# Patient Record
Sex: MALE | Race: WHITE | Employment: OTHER | URBAN - METROPOLITAN AREA
[De-identification: names, ages, dates, MRNs, and addresses within clinical notes are randomized per-mention and may not be internally consistent; named-entity substitution may affect disease eponyms.]

---

## 2017-05-15 ENCOUNTER — ANESTHESIA EVENT (OUTPATIENT)
Dept: SURGERY | Age: 77
End: 2017-05-15
Payer: MEDICARE

## 2017-05-15 ENCOUNTER — HOSPITAL ENCOUNTER (OUTPATIENT)
Age: 77
Setting detail: OBSERVATION
Discharge: HOME | End: 2017-05-16
Attending: UROLOGY | Admitting: UROLOGY
Payer: MEDICARE

## 2017-05-15 ENCOUNTER — ANESTHESIA (OUTPATIENT)
Dept: SURGERY | Age: 77
End: 2017-05-15
Payer: MEDICARE

## 2017-05-15 PROBLEM — N32.0 BLADDER NECK CONTRACTURE: Status: ACTIVE | Noted: 2017-05-15

## 2017-05-15 PROCEDURE — 77030018846 HC SOL IRR STRL H20 ICUM -A: Performed by: UROLOGY

## 2017-05-15 PROCEDURE — 74011000258 HC RX REV CODE- 258: Performed by: UROLOGY

## 2017-05-15 PROCEDURE — 77030032490 HC SLV COMPR SCD KNE COVD -B: Performed by: UROLOGY

## 2017-05-15 PROCEDURE — 77030019927 HC TBNG IRR CYSTO BAXT -A: Performed by: UROLOGY

## 2017-05-15 PROCEDURE — 74011250637 HC RX REV CODE- 250/637: Performed by: UROLOGY

## 2017-05-15 PROCEDURE — 77030013614: Performed by: UROLOGY

## 2017-05-15 PROCEDURE — 77030021163 HC TUBE CYSTO IRR ICUM -A: Performed by: UROLOGY

## 2017-05-15 PROCEDURE — 77030006566 HC BG URIN -A: Performed by: UROLOGY

## 2017-05-15 PROCEDURE — 77030005546 HC CATH URETH FOL 3W BARD -A: Performed by: UROLOGY

## 2017-05-15 PROCEDURE — 77030010545: Performed by: UROLOGY

## 2017-05-15 PROCEDURE — 77030012884 HC SLV COMPR SCD MTEK -B: Performed by: UROLOGY

## 2017-05-15 PROCEDURE — 77030013079 HC BLNKT BAIR HGGR 3M -A: Performed by: ANESTHESIOLOGY

## 2017-05-15 PROCEDURE — 74011250637 HC RX REV CODE- 250/637: Performed by: ANESTHESIOLOGY

## 2017-05-15 PROCEDURE — 99218 HC RM OBSERVATION: CPT

## 2017-05-15 PROCEDURE — 77030012510 HC MSK AIRWY LMA TELE -B: Performed by: ANESTHESIOLOGY

## 2017-05-15 PROCEDURE — 77030005520 HC CATH URETH FOL38 BARD -A: Performed by: UROLOGY

## 2017-05-15 PROCEDURE — 77030018836 HC SOL IRR NACL ICUM -A: Performed by: UROLOGY

## 2017-05-15 PROCEDURE — 76010000149 HC OR TIME 1 TO 1.5 HR: Performed by: UROLOGY

## 2017-05-15 PROCEDURE — 77030010547 HC BG URIN W/UMETER -A: Performed by: UROLOGY

## 2017-05-15 PROCEDURE — 76210000016 HC OR PH I REC 1 TO 1.5 HR: Performed by: UROLOGY

## 2017-05-15 PROCEDURE — 76060000033 HC ANESTHESIA 1 TO 1.5 HR: Performed by: UROLOGY

## 2017-05-15 PROCEDURE — 74011250636 HC RX REV CODE- 250/636: Performed by: ANESTHESIOLOGY

## 2017-05-15 PROCEDURE — 74011250636 HC RX REV CODE- 250/636

## 2017-05-15 PROCEDURE — 74011000250 HC RX REV CODE- 250

## 2017-05-15 RX ORDER — NALOXONE HYDROCHLORIDE 0.4 MG/ML
0.4 INJECTION, SOLUTION INTRAMUSCULAR; INTRAVENOUS; SUBCUTANEOUS AS NEEDED
Status: DISCONTINUED | OUTPATIENT
Start: 2017-05-15 | End: 2017-05-16 | Stop reason: HOSPADM

## 2017-05-15 RX ORDER — PANTOPRAZOLE SODIUM 40 MG/1
40 TABLET, DELAYED RELEASE ORAL
Status: DISCONTINUED | OUTPATIENT
Start: 2017-05-16 | End: 2017-05-16 | Stop reason: HOSPADM

## 2017-05-15 RX ORDER — SOLIFENACIN SUCCINATE 5 MG/1
5 TABLET, FILM COATED ORAL DAILY
Status: DISCONTINUED | OUTPATIENT
Start: 2017-05-16 | End: 2017-05-16 | Stop reason: HOSPADM

## 2017-05-15 RX ORDER — SODIUM CHLORIDE, SODIUM LACTATE, POTASSIUM CHLORIDE, CALCIUM CHLORIDE 600; 310; 30; 20 MG/100ML; MG/100ML; MG/100ML; MG/100ML
100 INJECTION, SOLUTION INTRAVENOUS CONTINUOUS
Status: DISCONTINUED | OUTPATIENT
Start: 2017-05-15 | End: 2017-05-15 | Stop reason: HOSPADM

## 2017-05-15 RX ORDER — DEXTROSE MONOHYDRATE AND SODIUM CHLORIDE 5; .45 G/100ML; G/100ML
75 INJECTION, SOLUTION INTRAVENOUS CONTINUOUS
Status: DISCONTINUED | OUTPATIENT
Start: 2017-05-15 | End: 2017-05-16 | Stop reason: HOSPADM

## 2017-05-15 RX ORDER — DOCUSATE SODIUM 100 MG/1
100 CAPSULE, LIQUID FILLED ORAL 2 TIMES DAILY
Status: DISCONTINUED | OUTPATIENT
Start: 2017-05-15 | End: 2017-05-16 | Stop reason: HOSPADM

## 2017-05-15 RX ORDER — HYDROMORPHONE HYDROCHLORIDE 1 MG/ML
0.5 INJECTION, SOLUTION INTRAMUSCULAR; INTRAVENOUS; SUBCUTANEOUS
Status: DISCONTINUED | OUTPATIENT
Start: 2017-05-15 | End: 2017-05-16 | Stop reason: HOSPADM

## 2017-05-15 RX ORDER — CARVEDILOL 3.12 MG/1
3.12 TABLET ORAL 2 TIMES DAILY WITH MEALS
Status: DISCONTINUED | OUTPATIENT
Start: 2017-05-16 | End: 2017-05-16 | Stop reason: HOSPADM

## 2017-05-15 RX ORDER — ACETAMINOPHEN 325 MG/1
650 TABLET ORAL
Status: DISCONTINUED | OUTPATIENT
Start: 2017-05-15 | End: 2017-05-16 | Stop reason: HOSPADM

## 2017-05-15 RX ORDER — FAMOTIDINE 20 MG/1
20 TABLET, FILM COATED ORAL ONCE
Status: COMPLETED | OUTPATIENT
Start: 2017-05-15 | End: 2017-05-15

## 2017-05-15 RX ORDER — ONDANSETRON 2 MG/ML
INJECTION INTRAMUSCULAR; INTRAVENOUS AS NEEDED
Status: DISCONTINUED | OUTPATIENT
Start: 2017-05-15 | End: 2017-05-15 | Stop reason: HOSPADM

## 2017-05-15 RX ORDER — FENTANYL CITRATE 50 UG/ML
INJECTION, SOLUTION INTRAMUSCULAR; INTRAVENOUS AS NEEDED
Status: DISCONTINUED | OUTPATIENT
Start: 2017-05-15 | End: 2017-05-15 | Stop reason: HOSPADM

## 2017-05-15 RX ORDER — FENTANYL CITRATE 50 UG/ML
50 INJECTION, SOLUTION INTRAMUSCULAR; INTRAVENOUS AS NEEDED
Status: DISCONTINUED | OUTPATIENT
Start: 2017-05-15 | End: 2017-05-15

## 2017-05-15 RX ORDER — SODIUM CHLORIDE 0.9 % (FLUSH) 0.9 %
5-10 SYRINGE (ML) INJECTION AS NEEDED
Status: DISCONTINUED | OUTPATIENT
Start: 2017-05-15 | End: 2017-05-16 | Stop reason: HOSPADM

## 2017-05-15 RX ORDER — OXYCODONE AND ACETAMINOPHEN 5; 325 MG/1; MG/1
1 TABLET ORAL
Status: DISCONTINUED | OUTPATIENT
Start: 2017-05-15 | End: 2017-05-16 | Stop reason: HOSPADM

## 2017-05-15 RX ORDER — LIDOCAINE HYDROCHLORIDE 20 MG/ML
INJECTION, SOLUTION EPIDURAL; INFILTRATION; INTRACAUDAL; PERINEURAL AS NEEDED
Status: DISCONTINUED | OUTPATIENT
Start: 2017-05-15 | End: 2017-05-15 | Stop reason: HOSPADM

## 2017-05-15 RX ORDER — PROPOFOL 10 MG/ML
INJECTION, EMULSION INTRAVENOUS AS NEEDED
Status: DISCONTINUED | OUTPATIENT
Start: 2017-05-15 | End: 2017-05-15 | Stop reason: HOSPADM

## 2017-05-15 RX ORDER — ROSUVASTATIN CALCIUM 10 MG/1
20 TABLET, COATED ORAL
Status: DISCONTINUED | OUTPATIENT
Start: 2017-05-15 | End: 2017-05-16 | Stop reason: HOSPADM

## 2017-05-15 RX ORDER — CETIRIZINE HCL 10 MG
10 TABLET ORAL DAILY
Status: DISCONTINUED | OUTPATIENT
Start: 2017-05-16 | End: 2017-05-16 | Stop reason: HOSPADM

## 2017-05-15 RX ORDER — SODIUM CHLORIDE, SODIUM LACTATE, POTASSIUM CHLORIDE, CALCIUM CHLORIDE 600; 310; 30; 20 MG/100ML; MG/100ML; MG/100ML; MG/100ML
50 INJECTION, SOLUTION INTRAVENOUS CONTINUOUS
Status: DISCONTINUED | OUTPATIENT
Start: 2017-05-15 | End: 2017-05-15 | Stop reason: HOSPADM

## 2017-05-15 RX ORDER — CEFAZOLIN SODIUM IN 0.9 % NACL 2 G/100 ML
PLASTIC BAG, INJECTION (ML) INTRAVENOUS AS NEEDED
Status: DISCONTINUED | OUTPATIENT
Start: 2017-05-15 | End: 2017-05-15 | Stop reason: HOSPADM

## 2017-05-15 RX ORDER — HYDROMORPHONE HYDROCHLORIDE 2 MG/ML
0.5 INJECTION, SOLUTION INTRAMUSCULAR; INTRAVENOUS; SUBCUTANEOUS
Status: DISCONTINUED | OUTPATIENT
Start: 2017-05-15 | End: 2017-05-15

## 2017-05-15 RX ORDER — HYDROMORPHONE HYDROCHLORIDE 2 MG/ML
0.5 INJECTION, SOLUTION INTRAMUSCULAR; INTRAVENOUS; SUBCUTANEOUS
Status: DISCONTINUED | OUTPATIENT
Start: 2017-05-15 | End: 2017-05-15 | Stop reason: HOSPADM

## 2017-05-15 RX ORDER — LISINOPRIL 5 MG/1
2.5 TABLET ORAL DAILY
Status: DISCONTINUED | OUTPATIENT
Start: 2017-05-16 | End: 2017-05-16 | Stop reason: HOSPADM

## 2017-05-15 RX ORDER — ATROPA BELLADONNA AND OPIUM 16.2; 3 MG/1; MG/1
SUPPOSITORY RECTAL AS NEEDED
Status: DISCONTINUED | OUTPATIENT
Start: 2017-05-15 | End: 2017-05-15 | Stop reason: HOSPADM

## 2017-05-15 RX ORDER — AMOXICILLIN AND CLAVULANATE POTASSIUM 875; 125 MG/1; MG/1
1 TABLET, FILM COATED ORAL 2 TIMES DAILY
Status: DISCONTINUED | OUTPATIENT
Start: 2017-05-15 | End: 2017-05-16 | Stop reason: HOSPADM

## 2017-05-15 RX ORDER — ZOLPIDEM TARTRATE 5 MG/1
10 TABLET ORAL
Status: DISCONTINUED | OUTPATIENT
Start: 2017-05-15 | End: 2017-05-16 | Stop reason: HOSPADM

## 2017-05-15 RX ORDER — BISMUTH SUBSALICYLATE 262 MG
1 TABLET,CHEWABLE ORAL DAILY
Status: DISCONTINUED | OUTPATIENT
Start: 2017-05-16 | End: 2017-05-16 | Stop reason: HOSPADM

## 2017-05-15 RX ORDER — SODIUM CHLORIDE 0.9 % (FLUSH) 0.9 %
5-10 SYRINGE (ML) INJECTION EVERY 8 HOURS
Status: DISCONTINUED | OUTPATIENT
Start: 2017-05-15 | End: 2017-05-16 | Stop reason: HOSPADM

## 2017-05-15 RX ORDER — MIDAZOLAM HYDROCHLORIDE 1 MG/ML
INJECTION, SOLUTION INTRAMUSCULAR; INTRAVENOUS AS NEEDED
Status: DISCONTINUED | OUTPATIENT
Start: 2017-05-15 | End: 2017-05-15 | Stop reason: HOSPADM

## 2017-05-15 RX ORDER — FENTANYL CITRATE 50 UG/ML
50 INJECTION, SOLUTION INTRAMUSCULAR; INTRAVENOUS
Status: DISCONTINUED | OUTPATIENT
Start: 2017-05-15 | End: 2017-05-15 | Stop reason: HOSPADM

## 2017-05-15 RX ORDER — LANOLIN ALCOHOL/MO/W.PET/CERES
325 CREAM (GRAM) TOPICAL
Status: DISCONTINUED | OUTPATIENT
Start: 2017-05-16 | End: 2017-05-16 | Stop reason: HOSPADM

## 2017-05-15 RX ADMIN — DEXTROSE MONOHYDRATE AND SODIUM CHLORIDE 75 ML/HR: 5; .45 INJECTION, SOLUTION INTRAVENOUS at 18:52

## 2017-05-15 RX ADMIN — ONDANSETRON 4 MG: 2 INJECTION INTRAMUSCULAR; INTRAVENOUS at 16:55

## 2017-05-15 RX ADMIN — FENTANYL CITRATE 50 MCG: 50 INJECTION, SOLUTION INTRAMUSCULAR; INTRAVENOUS at 17:28

## 2017-05-15 RX ADMIN — PROPOFOL 180 MG: 10 INJECTION, EMULSION INTRAVENOUS at 16:58

## 2017-05-15 RX ADMIN — SODIUM CHLORIDE, SODIUM LACTATE, POTASSIUM CHLORIDE, AND CALCIUM CHLORIDE: 600; 310; 30; 20 INJECTION, SOLUTION INTRAVENOUS at 15:51

## 2017-05-15 RX ADMIN — AMOXICILLIN AND CLAVULANATE POTASSIUM 1 TABLET: 875; 125 TABLET, FILM COATED ORAL at 22:22

## 2017-05-15 RX ADMIN — FENTANYL CITRATE 50 MCG: 50 INJECTION, SOLUTION INTRAMUSCULAR; INTRAVENOUS at 16:56

## 2017-05-15 RX ADMIN — FAMOTIDINE 20 MG: 20 TABLET ORAL at 15:01

## 2017-05-15 RX ADMIN — Medication 2 G: at 16:55

## 2017-05-15 RX ADMIN — MIDAZOLAM HYDROCHLORIDE 2 MG: 1 INJECTION, SOLUTION INTRAMUSCULAR; INTRAVENOUS at 16:51

## 2017-05-15 RX ADMIN — LIDOCAINE HYDROCHLORIDE 40 MG: 20 INJECTION, SOLUTION EPIDURAL; INFILTRATION; INTRACAUDAL; PERINEURAL at 16:58

## 2017-05-15 NOTE — ANESTHESIA POSTPROCEDURE EVALUATION
Post-Anesthesia Evaluation and Assessment    Patient: Ben Rees MRN: [id number]  SSN: [id number]    YOB: 1940  Age: 68 y.o. Sex: male       Cardiovascular Function/Vital Signs  Visit Vitals    /89    Pulse 69    Temp 36.3 °C (97.4 °F)    Resp 12    Ht 6' 2\" (1.88 m)    Wt 94.8 kg (209 lb)    SpO2 96%    BMI 26.83 kg/m2       Patient is status post general anesthesia for Procedure(s):  TRANSURETHRAL INCISION OF PROSTATE with baldder neck contracture. Nausea/Vomiting: None    Postoperative hydration reviewed and adequate. Pain:  Pain Scale 1: Numeric (0 - 10) (05/15/17 1759)  Pain Intensity 1: 0 (05/15/17 1759)   Managed    Neurological Status:   Neuro (WDL): Within Defined Limits (05/15/17 1445)   At baseline    Mental Status and Level of Consciousness: Arousable    Pulmonary Status:   O2 Device: Room air (05/15/17 1804)   Adequate oxygenation and airway patent    Complications related to anesthesia: None    Post-anesthesia assessment completed.  No concerns    Signed By: Ольга Holloway MD     May 15, 2017

## 2017-05-15 NOTE — PERIOP NOTES
Wife updated. slim a&ox3. W/o c./o  1830 Dr Melissa Nieves at bedside assessing CBI- keep clear with CBI at fast drip  1845 Dr Melissa Nieves at bedside  1910 overflow waiting for nurse availability. Ate turkey sandwich. Without c/o . VS at baseline  2020 TRANSFER - OUT REPORT:    Verbal report given to Howie Reddy (name) on Cho Riky  being transferred to 2200 (unit) for routine post - op       Report consisted of patients Situation, Background, Assessment and   Recommendations(SBAR). Information from the following report(s) SBAR, Kardex, OR Summary, Procedure Summary, Intake/Output and MAR was reviewed with the receiving nurse. Opportunity for questions and clarification was provided.       Patient transported with:   Registered Nurse

## 2017-05-15 NOTE — IP AVS SNAPSHOT
[address] Patient: Jm Yarbrough MRN: [id number] CVY:30/23/[birth year] You are allergic to the following No active allergies Recent Documentation Height Weight BMI Smoking Status 1.88 m 94.8 kg 26.83 kg/m2 Former Smoker Emergency Contacts Name Discharge Info Relation Home Work Mobile [address]  Spouse [3] [phone] About your hospitalization You were admitted on:  May 15, 2017 You last received care in the:  [address] [place] [address],[address] You were discharged on:  May 16, 2017 Unit phone number:  [phone] Why you were hospitalized Your primary diagnosis was:  Bladder Neck Contracture Providers Seen During Your Hospitalizations Provider Role Specialty Primary office phone Holli Oconnor MD Attending Provider Urology [phone] Your Primary Care Physician (PCP) Primary Care Physician Office Phone Office Fax Colin Goodman [phone] Follow-up Information Follow up With Details Comments Contact Info Shashi Acevedo MD   [address] [facility] [address] [facility] 
[phone] Holli Oconnor MD In 3 days call for follow up appointment for 3 to 5 days [address] [place] [address] 
[phone] Your Appointments Friday May 19, 2017 11:00 AM EDT  
POST OP with Holli Oconnor MD  
[facility] [place] [address] [address]  
[phone] Current Discharge Medication List  
  
START taking these medications Dose & Instructions Dispensing Information Comments Morning Noon Evening Bedtime  
 docusate sodium 100 mg capsule Commonly known as:  Kerriebest Lima Your last dose was:     
   
Your next dose is:    
   
   
 Dose:  100 mg  
 Take 1 Cap by mouth two (2) times a day for 30 days. Quantity:  60 Cap Refills:  0  
     
   
   
   
  
 oxyCODONE-acetaminophen 5-325 mg per tablet Commonly known as:  PERCOCET Your last dose was: Your next dose is:    
   
   
 Dose:  1 Tab Take 1 Tab by mouth every six (6) hours as needed for Pain. Max Daily Amount: 4 Tabs. Quantity:  15 Tab Refills:  0 CONTINUE these medications which have CHANGED Dose & Instructions Dispensing Information Comments Morning Noon Evening Bedtime * amoxicillin-clavulanate 875-125 mg per tablet Commonly known as:  AUGMENTIN What changed:  Another medication with the same name was added. Make sure you understand how and when to take each. Your last dose was: Your next dose is:    
   
   
 Dose:  1 Tab Take 1 Tab by mouth two (2) times a day. Quantity:  10 Tab Refills:  0  
     
   
   
   
  
 * amoxicillin-clavulanate 875-125 mg per tablet Commonly known as:  AUGMENTIN What changed: You were already taking a medication with the same name, and this prescription was added. Make sure you understand how and when to take each. Your last dose was: Your next dose is:    
   
   
 Dose:  1 Tab Take 1 Tab by mouth every twelve (12) hours. Quantity:  10 Tab Refills:  0  
     
   
   
   
  
 * Notice: This list has 2 medication(s) that are the same as other medications prescribed for you. Read the directions carefully, and ask your doctor or other care provider to review them with you. CONTINUE these medications which have NOT CHANGED Dose & Instructions Dispensing Information Comments Morning Noon Evening Bedtime Biotin 2,500 mcg Cap Your last dose was: Your next dose is: Take  by mouth. Refills:  0  
     
   
   
   
  
 carvedilol 3.125 mg tablet Commonly known as:  Adebayo Heard Your last dose was: Your next dose is:    
   
   
 Dose:  3.125 mg Take 3.125 mg by mouth two (2) times daily (with meals). Refills:  0  
     
   
   
   
  
 cetirizine 10 mg tablet Commonly known as:  ZYRTEC Your last dose was: Your next dose is: Take  by mouth. Refills:  0  
     
   
   
   
  
 CRESTOR 20 mg tablet Generic drug:  rosuvastatin Your last dose was: Your next dose is:    
   
   
 Dose:  20 mg Take 20 mg by mouth nightly. Refills:  0  
     
   
   
   
  
 cyanocobalamin 1,000 mcg tablet Your last dose was: Your next dose is:    
   
   
 Dose:  1000 mcg Take 1,000 mcg by mouth daily. Refills:  0 FERROUS SULFATE PO Your last dose was: Your next dose is: Take  by mouth. Refills:  0  
     
   
   
   
  
 lisinopril 2.5 mg tablet Commonly known as:  Abranamairani Burk Your last dose was: Your next dose is: Take  by mouth daily. Refills:  0  
     
   
   
   
  
 multivitamin tablet Commonly known as:  ONE A DAY Your last dose was: Your next dose is:    
   
   
 Dose:  1 Tab Take 1 Tab by mouth daily. Refills:  0  
     
   
   
   
  
 omeprazole 40 mg capsule Commonly known as:  PRILOSEC Your last dose was: Your next dose is:    
   
   
  Refills:  0  
     
   
   
   
  
 OTHER(NON-FORMULARY) Your last dose was: Your next dose is: Please dispense a 6 ml volume of Trimix consisting of PGE 50 mcg/Papaverine 30 mg/Phentolamine 2 mg per ml. Patient is to inject 1.0 ml as directed. Quantity:  6 Each Refills:  5  
     
   
   
   
  
 solifenacin 5 mg tablet Commonly known as:  Migue Galarza Your last dose was: Your next dose is:    
   
   
 Dose:  5 mg Take 1 Tab by mouth daily. Quantity:  90 Tab Refills:  3  
     
   
   
   
  
 tadalafil 5 mg tablet Commonly known as:  CIALIS Your last dose was: Your next dose is:    
   
   
 Dose:  5 mg Take 1 Tab by mouth daily as needed. Quantity:  90 Tab Refills:  3  
     
   
   
   
  
 testosterone enanthate 200 mg/mL injection Commonly known as:  DELATESTRYL Your last dose was: Your next dose is:    
   
   
 Dose:  200 mg  
1 mL by IntraMUSCular route every fourteen (14) days. Quantity:  5 mL Refills:  4  
     
   
   
   
  
 vitamin E 400 unit capsule Commonly known as:  [address] Your last dose was: Your next dose is: Take  by mouth daily. Refills:  0 VOLTAREN 1 % Gel Generic drug:  diclofenac Your last dose was: Your next dose is:    
   
   
 Apply  to affected area four (4) times daily. Refills:  0  
     
   
   
   
  
 zolpidem CR 12.5 mg tablet Commonly known as:  AMBIEN CR Your last dose was: Your next dose is:    
   
   
 Dose:  12.5 mg Take 12.5 mg by mouth nightly as needed for Sleep. Refills:  0 STOP taking these medications   
 aspirin delayed-release 81 mg tablet  
   
  
 clopidogrel 75 mg Tab Commonly known as:  PLAVIX Where to Get Your Medications Information on where to get these meds will be given to you by the nurse or doctor. ! Ask your nurse or doctor about these medications  
  amoxicillin-clavulanate 875-125 mg per tablet  
 docusate sodium 100 mg capsule  
 oxyCODONE-acetaminophen 5-325 mg per tablet Discharge Instructions DISCHARGE SUMMARY from Nurse The following personal items are in your possession at time of discharge: 
 
Dental Appliances: None Visual Aid: With patient, Glasses Hearing Aids/Status: Left, Right, With patient Home Medications: None Jewelry: Watch Clothing: Undergarments, Pants, Shirt, Footwear Other Valuables: Other (comment) (hearing aids) PATIENT INSTRUCTIONS: 
 
 
F-face looks uneven A-arms unable to move or move unevenly S-speech slurred or non-existent T-time-call 911 as soon as signs and symptoms begin-DO NOT go Back to bed or wait to see if you get better-TIME IS BRAIN. Warning Signs of HEART ATTACK Call 911 if you have these symptoms: 
? Chest discomfort. Most heart attacks involve discomfort in the center of the chest that lasts more than a few minutes, or that goes away and comes back. It can feel like uncomfortable pressure, squeezing, fullness, or pain. ? Discomfort in other areas of the upper body. Symptoms can include pain or discomfort in one or both arms, the back, neck, jaw, or stomach. ? Shortness of breath with or without chest discomfort. ? Other signs may include breaking out in a cold sweat, nausea, or lightheadedness. Don't wait more than five minutes to call [address]! Fast action can save your life. Calling 911 is almost always the fastest way to get lifesaving treatment. Emergency Medical Services staff can begin treatment when they arrive  up to an hour sooner than if someone gets to the hospital by car. The discharge information has been reviewed with the patient. The patient verbalized understanding. Discharge medications reviewed with the patient and appropriate educational materials and side effects teaching were provided. Patient armband removed and shredded Discharge medications reviewed with the patient and appropriate educational materials and side effects teaching were provided. Discharge Instructions Attachments/References TURP: POST-OP (ENGLISH) Discharge Orders None [employer] Announcement We are excited to announce that we are making your provider's discharge notes available to you in [employer]. You will see these notes when they are completed and signed by the physician that discharged you from your recent hospital stay. If you have any questions or concerns about any information you see in [employer], please call the Health Information Department where you were seen or reach out to your Primary Care Provider for more information about your plan of care. Introducing [facility]! Alvin Santana introduces [employer] patient portal. Now you can access parts of your medical record, email your doctor's office, and request medication refills online. 1. In your internet browser, go to [email or web address]. [employer] 2. Click on the First Time User? Click Here link in the Sign In box. You will see the New Member Sign Up page. 3. Enter your Liquidnet Access Code exactly as it appears below. You will not need to use this code after youve completed the sign-up process. If you do not sign up before the expiration date, you must request a new code. · [employer] Access Code: [email or web address] Expires: 6/19/2017  2:28 PM 
 
4. Enter the last four digits of your Social Security Number (xxxx) and Date of Birth (mm/dd/yyyy) as indicated and click Submit. You will be taken to the next sign-up page. 5. Create a [employer] ID. This will be your [employer] login ID and cannot be changed, so think of one that is secure and easy to remember. 6. Create a [employer] password. You can change your password at any time. 7. Enter your Password Reset Question and Answer. This can be used at a later time if you forget your password. 8. Enter your e-mail address.  You will receive e-mail notification when new information is available in [employer]. 9. Click Sign Up. You can now view and download portions of your medical record. 10. Click the Download Summary menu link to download a portable copy of your medical information. If you have questions, please visit the Frequently Asked Questions section of the [employer] website. Remember, [employer] is NOT to be used for urgent needs. For medical emergencies, dial 911. Now available from your iPhone and Android! General Information Please provide this summary of care documentation to your next provider. Patient Signature:  ____________________________________________________________ Date:  ____________________________________________________________  
  
Quinton Bough Provider Signature:  ____________________________________________________________ Date:  ____________________________________________________________ More Information Transurethral Resection of the Prostate (TURP): What to Expect at [facility] Your Recovery Transurethral resection of the prostate (TURP) is surgery to remove prostate tissue. It is done when an overgrown prostate gland is pressing on the urethra and making it hard for a man to urinate. You may need a urinary catheter for a short time. It is a flexible plastic tube used to drain urine from your bladder when you can't urinate on your own. If it is still in place when you go home, your doctor will give you instructions on how to care for your catheter. For several days after surgery, you may feel burning when you urinate. Your urine may be pink for 1 to 3 weeks after surgery. You also may have bladder cramps, or spasms. Your doctor may give you medicine to help control the spasms. You may still feel like you need to urinate often in the weeks after your surgery. It often takes up to 6 weeks for this to get better.  After you have healed, you may have less trouble urinating. You may have better control over starting and stopping your urine stream. And you may feel like you get more relief when you urinate. Most men can return to work or many of their usual tasks in 1 to 3 weeks. But for about 6 weeks, try to avoid heavy lifting and strenuous activities that might put extra pressure on your bladder. Most men still can have erections after surgery (if they were able to have them before surgery). But they may not ejaculate when they have an orgasm. Semen may go into the bladder instead of out through the penis. This is called retrograde ejaculation. It does not hurt and is not harmful to your health. This care sheet gives you a general idea about how long it will take for you to recover. But each person recovers at a different pace. Follow the steps below to get better as quickly as possible. How can you care for yourself at home? Activity · Rest when you feel tired. · Be active. Walking is a good choice. · Allow your body to heal. Don't move quickly or lift anything heavy until you are feeling better. · Ask your doctor when you can drive again. · Many people are able to return to work within 1 to 3 weeks after surgery. It depends on the type of work you do and how you feel. · Do not put anything in your rectum, such as an enema or suppository, for 4 to 6 weeks after the surgery. · You may shower and take baths when your doctor says it is okay. · Ask your doctor when it is okay for you to have sex. Diet · You can eat your normal diet. If your stomach is upset, try bland, low-fat foods like plain rice, broiled chicken, toast, and yogurt. · If your bowel movements are not regular right after surgery, try to avoid constipation and straining. Drink plenty of water. Your doctor may suggest fiber, a stool softener, or a mild laxative. Medicines · Your doctor will tell you if and when you can restart your medicines.  He or she will also give you instructions about taking any new medicines. · If you take aspirin or some other blood thinner, be sure to talk to your doctor. He or she will tell you if and when to start taking those medicines again. Make sure that you understand exactly what your doctor wants you to do. · Be safe with medicines. Read and follow all instructions on the label. ¨ If the doctor gave you a prescription medicine for pain, take it as prescribed. ¨ If you are not taking a prescription pain medicine, ask your doctor if you can take an over-the-counter medicine. · Take your antibiotics as directed. Do not stop taking them just because you feel better. You need to take the full course of antibiotics. Follow-up care is a key part of your treatment and safety. Be sure to make and go to all appointments, and call your doctor if you are having problems. It's also a good idea to know your test results and keep a list of the medicines you take. When should you call for help? Call 911 anytime you think you may need emergency care. For example, call if: 
· You passed out (lost consciousness). · You have symptoms of a blood clot in your lung (called a pulmonary embolism). These may include: 
¨ Sudden chest pain. ¨ Trouble breathing. ¨ Coughing up blood. Call your doctor now or seek immediate medical care if: 
· You have symptoms of infection, such as: 
¨ Increased pain, swelling, warmth, or redness around the cut. ¨ Red streaks leading from the cut. ¨ Pus draining from the cut. ¨ A fever. · You have new or more blood or blood clots in your urine. · You have pain in the flank, which is just below the rib cage and above the waist on either side of the back. · You have new or worse pain or burning when you urinate. · You cannot urinate or have trouble urinating. · You have a new or worse problem with controlling your bladder. · You have signs of a blood clot, such as: ¨ Pain in your calf, back of the knee, thigh, or groin. ¨ Redness and swelling in your leg or groin. Watch closely for changes in your health, and be sure to contact your doctor if: 
· You are not getting better as expected. · You do not have a bowel movement after taking a laxative. Where can you learn more? Go to http://vania-noy.info/. Enter P408 in the search box to learn more about \"Transurethral Resection of the Prostate (TURP): What to Expect at Home. \" Current as of: October 31, 2016 Content Version: 11.2 © 5352-1479 [employer]. Care instructions adapted under license by [employer] (which disclaims liability or warranty for this information). If you have questions about a medical condition or this instruction, always ask your healthcare professional. [address] any warranty or liability for your use of this information.

## 2017-05-15 NOTE — IP AVS SNAPSHOT
Current Discharge Medication List  
  
START taking these medications Dose & Instructions Dispensing Information Comments Morning Noon Evening Bedtime  
 docusate sodium 100 mg capsule Commonly known as:  Martynick Rico Your last dose was: Your next dose is:    
   
   
 Dose:  100 mg Take 1 Cap by mouth two (2) times a day for 30 days. Quantity:  60 Cap Refills:  0  
     
   
   
   
  
 oxyCODONE-acetaminophen 5-325 mg per tablet Commonly known as:  PERCOCET Your last dose was: Your next dose is:    
   
   
 Dose:  1 Tab Take 1 Tab by mouth every six (6) hours as needed for Pain. Max Daily Amount: 4 Tabs. Quantity:  15 Tab Refills:  0 CONTINUE these medications which have CHANGED Dose & Instructions Dispensing Information Comments Morning Noon Evening Bedtime * amoxicillin-clavulanate 875-125 mg per tablet Commonly known as:  AUGMENTIN What changed:  Another medication with the same name was added. Make sure you understand how and when to take each. Your last dose was: Your next dose is:    
   
   
 Dose:  1 Tab Take 1 Tab by mouth two (2) times a day. Quantity:  10 Tab Refills:  0  
     
   
   
   
  
 * amoxicillin-clavulanate 875-125 mg per tablet Commonly known as:  AUGMENTIN What changed: You were already taking a medication with the same name, and this prescription was added. Make sure you understand how and when to take each. Your last dose was: Your next dose is:    
   
   
 Dose:  1 Tab Take 1 Tab by mouth every twelve (12) hours. Quantity:  10 Tab Refills:  0  
     
   
   
   
  
 * Notice: This list has 2 medication(s) that are the same as other medications prescribed for you. Read the directions carefully, and ask your doctor or other care provider to review them with you. CONTINUE these medications which have NOT CHANGED Dose & Instructions Dispensing Information Comments Morning Noon Evening Bedtime Biotin 2,500 mcg Cap Your last dose was: Your next dose is: Take  by mouth. Refills:  0  
     
   
   
   
  
 carvedilol 3.125 mg tablet Commonly known as:  Gasper Epi Your last dose was: Your next dose is:    
   
   
 Dose:  3.125 mg Take 3.125 mg by mouth two (2) times daily (with meals). Refills:  0  
     
   
   
   
  
 cetirizine 10 mg tablet Commonly known as:  ZYRTEC Your last dose was: Your next dose is: Take  by mouth. Refills:  0  
     
   
   
   
  
 CRESTOR 20 mg tablet Generic drug:  rosuvastatin Your last dose was: Your next dose is:    
   
   
 Dose:  20 mg Take 20 mg by mouth nightly. Refills:  0  
     
   
   
   
  
 cyanocobalamin 1,000 mcg tablet Your last dose was: Your next dose is:    
   
   
 Dose:  1000 mcg Take 1,000 mcg by mouth daily. Refills:  0 FERROUS SULFATE PO Your last dose was: Your next dose is: Take  by mouth. Refills:  0  
     
   
   
   
  
 lisinopril 2.5 mg tablet Commonly known as:  Alexis Mash Your last dose was: Your next dose is: Take  by mouth daily. Refills:  0  
     
   
   
   
  
 multivitamin tablet Commonly known as:  ONE A DAY Your last dose was: Your next dose is:    
   
   
 Dose:  1 Tab Take 1 Tab by mouth daily. Refills:  0  
     
   
   
   
  
 omeprazole 40 mg capsule Commonly known as:  PRILOSEC Your last dose was: Your next dose is:    
   
   
  Refills:  0  
     
   
   
   
  
 OTHER(NON-FORMULARY) Your last dose was: Your next dose is: Please dispense a 6 ml volume of Trimix consisting of PGE 50 mcg/Papaverine 30 mg/Phentolamine 2 mg per ml.  Patient is to inject 1.0 ml as directed. Quantity:  6 Each Refills:  5  
     
   
   
   
  
 solifenacin 5 mg tablet Commonly known as:  Tha Han Your last dose was: Your next dose is:    
   
   
 Dose:  5 mg Take 1 Tab by mouth daily. Quantity:  90 Tab Refills:  3  
     
   
   
   
  
 tadalafil 5 mg tablet Commonly known as:  CIALIS Your last dose was: Your next dose is:    
   
   
 Dose:  5 mg Take 1 Tab by mouth daily as needed. Quantity:  90 Tab Refills:  3  
     
   
   
   
  
 testosterone enanthate 200 mg/mL injection Commonly known as:  DELATESTRYL Your last dose was: Your next dose is:    
   
   
 Dose:  200 mg  
1 mL by IntraMUSCular route every fourteen (14) days. Quantity:  5 mL Refills:  4  
     
   
   
   
  
 vitamin E 400 unit capsule Commonly known as:  [address] Your last dose was: Your next dose is: Take  by mouth daily. Refills:  0 VOLTAREN 1 % Gel Generic drug:  diclofenac Your last dose was: Your next dose is:    
   
   
 Apply  to affected area four (4) times daily. Refills:  0  
     
   
   
   
  
 zolpidem CR 12.5 mg tablet Commonly known as:  AMBIEN CR Your last dose was: Your next dose is:    
   
   
 Dose:  12.5 mg Take 12.5 mg by mouth nightly as needed for Sleep. Refills:  0 STOP taking these medications   
 aspirin delayed-release 81 mg tablet  
   
  
 clopidogrel 75 mg Tab Commonly known as:  PLAVIX Where to Get Your Medications Information on where to get these meds will be given to you by the nurse or doctor. ! Ask your nurse or doctor about these medications  
  amoxicillin-clavulanate 875-125 mg per tablet  
 docusate sodium 100 mg capsule  
 oxyCODONE-acetaminophen 5-325 mg per tablet

## 2017-05-15 NOTE — ANESTHESIA PREPROCEDURE EVALUATION
Anesthetic History   No history of anesthetic complications            Review of Systems / Medical History  Patient summary reviewed and pertinent labs reviewed    Pulmonary  Within defined limits                 Neuro/Psych   Within defined limits           Cardiovascular    Hypertension: well controlled          CAD and cardiac stents         GI/Hepatic/Renal     GERD: well controlled           Endo/Other        Arthritis and cancer     Other Findings   Comments:   Risk Factors for Postoperative nausea/vomiting:       History of postoperative nausea/vomiting? NO       Female? NO       Motion sickness? NO       Intended opioid administration for postoperative analgesia? YES      Smoking Abstinence  Current Smoker? NO  Elective Surgery? YES  Seen preoperatively by anesthesiologist or proxy prior to day of surgery? YES  Pt abstained from smoking 24 hours prior to anesthesia?  N/A           Physical Exam    Airway  Mallampati: III  TM Distance: 4 - 6 cm  Neck ROM: decreased range of motion   Mouth opening: Normal     Cardiovascular    Rhythm: regular  Rate: normal         Dental    Dentition: Poor dentition     Pulmonary  Breath sounds clear to auscultation               Abdominal  GI exam deferred       Other Findings            Anesthetic Plan    ASA: 3  Anesthesia type: general          Induction: Intravenous  Anesthetic plan and risks discussed with: Patient

## 2017-05-15 NOTE — BRIEF OP NOTE
BRIEF OPERATIVE NOTE    Date of Procedure: 5/15/2017   Preoperative Diagnosis: n40.1,n13.8, Bladder neck contracture/BPH S/P PVP. Postoperative Diagnosis: same  Procedure(s):  TRANSURETHRAL INCISION OF baldder neck contracture  Surgeon(s) and Role:     * Anh Cabrera MD - Primary         Surgical Staff:  Circ-1: Elaina Edwards RN  Circ-2: Elizabeth Aguilar  Scrub Tech-1: Naoma Danger  Event Time In   Incision Start  5:16 PM   Incision Close  5:31 PM     Anesthesia: General   Estimated Blood Loss: less than 25 cc. Specimens:  none  Findings:  Tight 15 fr bladder neck contracture opened wide with TUI- BNC at 4 and 8 oclock. Ureters were well away for TUI-BNC incisions. Complications: none  Implants/Drains:  22 fr 3 way pascal to continuous bladder irrigation with  Saline.       Anh Cabrera MD

## 2017-05-16 VITALS
BODY MASS INDEX: 26.82 KG/M2 | WEIGHT: 209 LBS | RESPIRATION RATE: 14 BRPM | HEART RATE: 68 BPM | TEMPERATURE: 97.9 F | OXYGEN SATURATION: 94 % | HEIGHT: 74 IN | SYSTOLIC BLOOD PRESSURE: 147 MMHG | DIASTOLIC BLOOD PRESSURE: 87 MMHG

## 2017-05-16 LAB
ANION GAP BLD CALC-SCNC: 5 MMOL/L (ref 3–18)
BUN SERPL-MCNC: 19 MG/DL (ref 7–18)
BUN/CREAT SERPL: 15 (ref 12–20)
CALCIUM SERPL-MCNC: 8.2 MG/DL (ref 8.5–10.1)
CHLORIDE SERPL-SCNC: 106 MMOL/L (ref 100–108)
CO2 SERPL-SCNC: 29 MMOL/L (ref 21–32)
CREAT SERPL-MCNC: 1.29 MG/DL (ref 0.6–1.3)
ERYTHROCYTE [DISTWIDTH] IN BLOOD BY AUTOMATED COUNT: 13.3 % (ref 11.6–14.5)
GLUCOSE SERPL-MCNC: 91 MG/DL (ref 74–99)
HCT VFR BLD AUTO: 45.4 % (ref 36–48)
HGB BLD-MCNC: 15.1 G/DL (ref 13–16)
MCH RBC QN AUTO: 31 PG (ref 24–34)
MCHC RBC AUTO-ENTMCNC: 33.3 G/DL (ref 31–37)
MCV RBC AUTO: 93.2 FL (ref 74–97)
PLATELET # BLD AUTO: 133 K/UL (ref 135–420)
PMV BLD AUTO: 9 FL (ref 9.2–11.8)
POTASSIUM SERPL-SCNC: 4.9 MMOL/L (ref 3.5–5.5)
RBC # BLD AUTO: 4.87 M/UL (ref 4.7–5.5)
SODIUM SERPL-SCNC: 140 MMOL/L (ref 136–145)
WBC # BLD AUTO: 5.8 K/UL (ref 4.6–13.2)

## 2017-05-16 PROCEDURE — 99218 HC RM OBSERVATION: CPT

## 2017-05-16 PROCEDURE — 80048 BASIC METABOLIC PNL TOTAL CA: CPT | Performed by: UROLOGY

## 2017-05-16 PROCEDURE — 51798 US URINE CAPACITY MEASURE: CPT

## 2017-05-16 PROCEDURE — 36415 COLL VENOUS BLD VENIPUNCTURE: CPT | Performed by: UROLOGY

## 2017-05-16 PROCEDURE — 74011250637 HC RX REV CODE- 250/637: Performed by: UROLOGY

## 2017-05-16 PROCEDURE — 85027 COMPLETE CBC AUTOMATED: CPT | Performed by: UROLOGY

## 2017-05-16 PROCEDURE — 74011000258 HC RX REV CODE- 258: Performed by: UROLOGY

## 2017-05-16 RX ORDER — DOCUSATE SODIUM 100 MG/1
100 CAPSULE, LIQUID FILLED ORAL 2 TIMES DAILY
Qty: 60 CAP | Refills: 0 | Status: SHIPPED | OUTPATIENT
Start: 2017-05-16 | End: 2017-06-15

## 2017-05-16 RX ORDER — AMOXICILLIN AND CLAVULANATE POTASSIUM 875; 125 MG/1; MG/1
1 TABLET, FILM COATED ORAL EVERY 12 HOURS
Qty: 10 TAB | Refills: 0 | Status: SHIPPED | OUTPATIENT
Start: 2017-05-16 | End: 2017-05-19

## 2017-05-16 RX ORDER — OXYCODONE AND ACETAMINOPHEN 5; 325 MG/1; MG/1
1 TABLET ORAL
Qty: 15 TAB | Refills: 0 | Status: SHIPPED | OUTPATIENT
Start: 2017-05-16 | End: 2017-05-19

## 2017-05-16 RX ADMIN — SOLIFENACIN SUCCINATE 5 MG: 5 TABLET, FILM COATED ORAL at 09:37

## 2017-05-16 RX ADMIN — DOCUSATE SODIUM 100 MG: 100 CAPSULE, LIQUID FILLED ORAL at 00:29

## 2017-05-16 RX ADMIN — MULTIVITAMIN TABLET 1 TABLET: TABLET at 09:43

## 2017-05-16 RX ADMIN — FERROUS SULFATE TAB 325 MG (65 MG ELEMENTAL FE) 325 MG: 325 (65 FE) TAB at 07:53

## 2017-05-16 RX ADMIN — DEXTROSE MONOHYDRATE AND SODIUM CHLORIDE 75 ML/HR: 5; .45 INJECTION, SOLUTION INTRAVENOUS at 09:33

## 2017-05-16 RX ADMIN — AMOXICILLIN AND CLAVULANATE POTASSIUM 1 TABLET: 875; 125 TABLET, FILM COATED ORAL at 09:41

## 2017-05-16 RX ADMIN — CETIRIZINE HYDROCHLORIDE 10 MG: 10 TABLET, FILM COATED ORAL at 09:44

## 2017-05-16 RX ADMIN — ROSUVASTATIN CALCIUM 20 MG: 10 TABLET ORAL at 00:29

## 2017-05-16 RX ADMIN — CARVEDILOL 3.12 MG: 3.12 TABLET, FILM COATED ORAL at 07:47

## 2017-05-16 RX ADMIN — DOCUSATE SODIUM 100 MG: 100 CAPSULE, LIQUID FILLED ORAL at 09:38

## 2017-05-16 RX ADMIN — PANTOPRAZOLE SODIUM 40 MG: 40 TABLET, DELAYED RELEASE ORAL at 07:51

## 2017-05-16 RX ADMIN — LISINOPRIL 2.5 MG: 5 TABLET ORAL at 09:46

## 2017-05-16 NOTE — PROGRESS NOTES
2010 Received report from Reba Arriaza, PACU RN.     2030 Pt arrived on floor. Restarted bag of continuous fluid. Paul patent and draining. Oriented pt to room, whiteboard, and hourly rounding clock. Completed admission paperwork. Pt has no complaints of pain. Informed by pt that he can be combative if suddenly woken-up, asked to be talked to in order to wakeup before performing any tasks. Bed in lowest position, call bell within reach, will continue to monitor. 2115 Pt's medication are not verified, called Pharmacy to verify so meds can be distributed. 600 [facility]. new bag of CBI fluid. Answered Nurse Supervisor for more bags to have on unit. 0430 Pt ambulated to the restroom due to urge to have BM, was unable to void, but passed flatus. Bed in lowest position, call bell within reach, will continue to monitor. 0700 Pt lying in bed with no complaints. Bed in lowest position, call bell within reach, will continue to monitor. Bedside and Verbal shift change report given to Florecita Hartman RN (oncoming nurse) by Jailyn Segura RN (offgoing nurse). Report included the following information SBAR, Kardex, Intake/Output, MAR and Recent Results.

## 2017-05-16 NOTE — PROGRESS NOTES
conducted an initial consultation and Spiritual Assessment for Ever Rodriguez, who is a 68 y.o.,male. Patients Primary Language is: [place]. According to the patients EMR Roman Catholic Affiliation is: No preference. The reason the Patient came to the hospital is:   Patient Active Problem List    Diagnosis Date Noted    Bladder neck contracture 05/15/2017    Benign prostatic hypertrophy without urinary obstruction 10/06/2016    Urinary hesitancy 08/23/2016    Nocturia     BPH (benign prostatic hypertrophy) with urinary obstruction     Prostatitis, chronic     Urine stream spraying     Anorgasmia of male     Anejaculation     Erectile dysfunction     Colon cancer ([facility] [address]     Arthritis     Hypogonadism male 01/17/2014    Neurogenic bladder 05/08/2008        The  provided the following Interventions:  Initiated a relationship of care and support with patient in room 2214 at [phone]. Listened empathically as patient shared his story and plans to be going home this afternoon. Provided information about Spiritual Care Services. Offered prayer and assurance of continued prayers on patients behalf. The following outcomes were achieved:  Patient shared limited information about his medical narrative and spiritual journey/beliefs. Patient processed feeling about current hospitalization. Patient expressed gratitude for pastoral care visit. Assessment:  Patient does not have any Mosque/cultural needs that will affect patients preferences in health care. There are no further spiritual or Mosque issues which require Spiritual Care Services interventions at this time. Plan:  Chaplains will continue to follow and will provide pastoral care on an as needed/requested basis    . Juan Badillo   Spiritual Care   [phone]

## 2017-05-16 NOTE — PROGRESS NOTES
Care Management Interventions  Transition of Care Consult (CM Consult): Discharge Planning  Discharge Location  Discharge Placement: Home

## 2017-05-16 NOTE — DISCHARGE SUMMARY
Discharge Summary    Patient: Veronique Burch               Sex: male          DOA: 5/15/2017         YOB: 1940      Age:  68 y.o.        LOS:  LOS: 0 days                Admit Date: 5/15/2017    Discharge Date: 5/16/2017    Admission Diagnoses: n40.1,n13. 8; Bladder neck contracture    Discharge Diagnoses:    Problem List as of 5/16/2017  Date Reviewed: 5/15/2017          Codes Class Noted - Resolved    * (Principal)Bladder neck contracture ICD-10-CM: N32.0  ICD-9-CM: 596.0  5/15/2017 - Present        Benign prostatic hypertrophy without urinary obstruction ICD-10-CM: N40.0  ICD-9-CM: 600.00  10/6/2016 - Present        Urinary hesitancy ICD-10-CM: R39.11  ICD-9-CM: 788.64  8/23/2016 - Present        Nocturia ICD-10-CM: R35.1  ICD-9-CM: 788.43  Unknown - Present        BPH (benign prostatic hypertrophy) with urinary obstruction ICD-10-CM: N40.1, N13.8  ICD-9-CM: 600.01, 599.69  Unknown - Present        Prostatitis, chronic ICD-10-CM: N41.1  ICD-9-CM: 601.1  Unknown - Present        Urine stream spraying ICD-10-CM: R39.198  ICD-9-CM: 788.69  Unknown - Present        Anorgasmia of male ICD-10-CM: F52.32  ICD-9-CM: 302.74  Unknown - Present        Anejaculation ICD-10-CM: N53.19  ICD-9-CM: 608.89  Unknown - Present        Erectile dysfunction ICD-10-CM: N52.9  ICD-9-CM: 607.84  Unknown - Present        Colon cancer ([facility] [address] ICD-10-CM: C18.9  ICD-9-CM: 153.9  Unknown - Present    Overview Signed 3/30/2016 10:49 AM by Sonia Sands     S/P COLECTOMY W/CHEMO             Arthritis ICD-10-CM: M19.90  ICD-9-CM: 716.90  Unknown - Present        Hypogonadism male ICD-10-CM: E29.1  ICD-9-CM: 257.2  1/17/2014 - Present        Neurogenic bladder ICD-10-CM: N31.9  ICD-9-CM: 596.54  5/8/2008 - Present              Discharge Condition: Good    Hospital Course: The patient underwent TUIP and did well postoperatively. Their diet was advanced as tolerated. Activity was gradually increased. Pain was well controlled. The patient was discharged home on POD 1 in stable condition. Paul was clear and removed prior to discharge. Consults: None    Significant Diagnostic Studies: none    Discharge Medications:     Current Discharge Medication List      START taking these medications    Details   !! amoxicillin-clavulanate (AUGMENTIN) 875-125 mg per tablet Take 1 Tab by mouth every twelve (12) hours. Qty: 10 Tab, Refills: 0      oxyCODONE-acetaminophen (PERCOCET) 5-325 mg per tablet Take 1 Tab by mouth every six (6) hours as needed for Pain. Max Daily Amount: 4 Tabs. Qty: 15 Tab, Refills: 0      docusate sodium (COLACE) 100 mg capsule Take 1 Cap by mouth two (2) times a day for 30 days. Qty: 60 Cap, Refills: 0       !! - Potential duplicate medications found. Please discuss with provider. CONTINUE these medications which have NOT CHANGED    Details   rosuvastatin (CRESTOR) 20 mg tablet Take 20 mg by mouth nightly. lisinopril (PRINIVIL, ZESTRIL) 2.5 mg tablet Take  by mouth daily. OTHER,NON-FORMULARY, Please dispense a 6 ml volume of Trimix consisting of PGE 50 mcg/Papaverine 30 mg/Phentolamine 2 mg per ml. Patient is to inject 1.0 ml as directed. Qty: 6 Each, Refills: 5      solifenacin (VESICARE) 5 mg tablet Take 1 Tab by mouth daily. Qty: 90 Tab, Refills: 3      carvedilol (COREG) 3.125 mg tablet Take 3.125 mg by mouth two (2) times daily (with meals). cetirizine (ZYRTEC) 10 mg tablet Take  by mouth.      vitamin E (AQUA GEMS) 400 unit capsule Take  by mouth daily. FERROUS SULFATE PO Take  by mouth.      multivitamin (ONE A DAY) tablet Take 1 Tab by mouth daily. zolpidem CR (AMBIEN CR) 12.5 mg tablet Take 12.5 mg by mouth nightly as needed for Sleep. cyanocobalamin 1,000 mcg tablet Take 1,000 mcg by mouth daily. omeprazole (PRILOSEC) 40 mg capsule       Biotin 2,500 mcg cap Take  by mouth.       !! amoxicillin-clavulanate (AUGMENTIN) 875-125 mg per tablet Take 1 Tab by mouth two (2) times a day. Qty: 10 Tab, Refills: 0      diclofenac (VOLTAREN) 1 % gel Apply  to affected area four (4) times daily. tadalafil (CIALIS) 5 mg tablet Take 1 Tab by mouth daily as needed. Qty: 90 Tab, Refills: 3      testosterone enanthate (DELATESTRYL) 200 mg/mL injection 1 mL by IntraMUSCular route every fourteen (14) days. Qty: 5 mL, Refills: 4       !! - Potential duplicate medications found. Please discuss with provider. STOP taking these medications       clopidogrel (PLAVIX) 75 mg tab Comments:   Reason for Stopping:         aspirin delayed-release 81 mg tablet Comments:   Reason for Stopping:               Activity: No heavy lifting or strenuous activity for 1 week. Diet: Regular    Wound Care: Keep wound clean and dry. Disposition: Home with self care    Follow-up: The patient will be seen in the clinic in 3 days.

## 2017-05-16 NOTE — PROGRESS NOTES
[facility]   Discharge Planning/ Assessment    Reasons for Intervention: Chart reviewed pt clarified he lives on [address] not drive. He lives with wife, independent with ADL, no HH, has a cane and rolling walker at home. Demi He will transport and he can listen to instructions. Plan is home.     High Risk Criteria  [] Yes  [x]No   Physician Referral  [] Yes  [x]No        Date    Nursing Referral  [] Yes  [x]No        Date    Patient/Family Request  [] Yes  [x]No        Date       Resources:    Medicare  [] Yes  []No   Medicaid  [] Yes  []No   No Resources  [] Yes  []No   Private Insurance  [] Yes  []No    Name/Phone Number    Other  [] Yes  []No        (i.e. Workman's Comp)         Prior Services:    Prior Services  [] Yes  []No   Home Health  [] Yes  []No   [address]  [] Yes  []No        Number of [address]  [] Yes  []No       Meals on Wheels  [] Yes  []No   Office on Aging  [] Yes  []No   Transportation Services  [] Yes  []No   Nursing Home  [] Yes  []No        Nursing Home Name    [address]  [] Yes  []No        [address] Name    Other       Information Source:      Information obtained from  [] Patient  [] Parent   [] [address]  [] Child  [] Spouse   [] Significant Other/Partner   [] Friend      [] EMS    [] Nursing Home Chart          [] Other:   Chart Review  [] Yes  []No     Family/Support System:    Patient lives with  [] Alone    [] Spouse   [] Significant Other  [] Children  [] Caretaker   [] Parent  [] Sibling     [] Other       Other Support System:    Is the patient responsible for care of others  [] Yes  []No   Information of person caring for patient on  discharge    Managers financial affairs independently  [] Yes  []No   If no, explain:      Status Prior to Admission:    Mental Status  [] Awake  [] Alert  [] Oriented  [] Quiet/Calm [] Lethargic/Sedated   [] Disoriented  [] Restless/Anxious  [] Combative   Personal Care  [] Dependent  [] Independent Personal Care  [] Requires Assistance   Meal Preparation Ability  [] Independent   [] Standby Assistance   [] Minimal Assistance   [] Moderate Assistance  [] Maximum Assistance     [] Total Assistance   Chores  [] Independent with Chores   [] N/A Nursing Home Resident   [] Requires Assistance   Bowel/Bladder  [] Continent  [] Catheter  [] Incontinent  [] Ostomy Self-Care    [] Urine Diversion Self-Care  [] Maximum Assistance     [] Total Assistance   Number of Persons needed for assistance    DME at home  [] Michael Riley  [] Celio Cartwright, Straight   [] Commode    [] Bathroom/Grab Bars  [] Hospital Bed  [] Nebulizer  [] Oxygen           [] Raised Toilet Seat  [] Shower Chair  [] Side Rails for Bed   [] Tub Transfer Bench   [] Brady Diaz  [] Dante Carlson      [] Other:   Vendor      Treatment Presently Receiving:    Current Treatments  [] Chemotherapy  [] Dialysis  [] Insulin  [] IVAB [] IVF   [] O2  [] PCA   [] PT   [] RT   [] Tube Feedings   [] Wound Care     Psychosocial Evaluation:    Verbalized Knowledge of Disease Process  [] Patient  []Family   Coping with Disease Process  [] Patient  []Family   Requires Further Counseling Coping with Disease Process  [] Patient  []Family     Identified Projected Needs:    Home Health Aid  [] Yes  []No   Transportation  [] Yes  []No   Education  [] Yes  []No        Specific Education     Financial Counseling  [] Yes  []No   Inability to Care for Self/Will Require 24 hour care  [] Yes  []No   Pain Management  [] Yes  []No   Home Infusion Therapy  [] Yes  []No   Oxygen Therapy  [] Yes  []No   DME  [] Yes  []No   Long Term Care Placement  [] Yes  []No   Rehab  [] Yes  []No   Physical Therapy  [] Yes  []No   Needs Anticipated At This Time  [] Yes  []No     Intra-Hospital Referral:    [address]  [] Yes  []No     [] Yes  []No   Patient Representative  [] Yes  []No   Staff for Teaching Needs  [] Yes  []No Specialty Teaching Needs     Diabetic Educator  [] Yes  []No   Referral for Diabetic Educator Needed  [] Yes  []No  If Yes, place order for Nutritionist or Diabetic Consult     Tentative Discharge Plan:    Home with No Services  [] Yes  []No   Home with [address]  [] Yes  []No        If Yes, specify type    Home Care Program  [] Yes  []No        If Yes, specify type    Meals on Wheels  [] Yes  []No   Office of Aging  [] Yes  []No   NHP  [] Yes  []No   Return to the Nursing Home  [] Yes  []No   Rehab Therapy  [] Yes  []No   Acute Rehab  [] Yes  []No   Subacute Rehab  [] Yes  []No   Private Care  [] Yes  []No   Substance Abuse Referral  [] Yes  []No   Transportation  [] Yes  []No   Chore Service  [] Yes  []No   Inpatient Hospice  [] Yes  []No   OP RT  [] Yes  [] No   OP Hemo  [] Yes  [] No   OP PT  [] Yes  []No   Support Group  [] Yes  []No   Reach to Recovery  [] Yes  []No   OP Oncology Clinic  [] Yes  []No   Clinic Appointment  [] Yes  []No   DME  [] Yes  []No   Comments    Name of D/C Planner or  Given to Patient or Family Clarence Hoyt. William Corral RN   Phone Number         [phone]   Date 05/16/17   Time    If you are discharged home, whom do you designate to participate in your discharge plan and receive any information needed?      Enter name of designee         Phone # of designee         Address of designee         Updated         Patient refused to designate any           individual

## 2017-05-16 NOTE — PROGRESS NOTES
[address] Renetta Harris [facility], [address] [facility]  2017    Progress Note    Assessment:     Darien Cousin is a 68 y.o.  male with:     Patient Active Problem List   Diagnosis Code    Hypogonadism male E29.1    Nocturia R35.1    BPH (benign prostatic hypertrophy) with urinary obstruction N40.1, N13.8    Prostatitis, chronic N41.1    Urine stream spraying R39.198    Anorgasmia of male F49.29    Anejaculation N53.19    Erectile dysfunction N52.9    Colon cancer (HCC) C18.9    Arthritis M19.90    Urinary hesitancy R39.11    Benign prostatic hypertrophy without urinary obstruction N40.0    Neurogenic bladder N31.9    Bladder neck contracture N32.0     Stable S/P Extensive TUI - BNC. Plan:     - Discontinued CBI and will get patient out of bed. If urine stays clear will discharge. Dr. Darwin Nam will see patient later this morning and determine in Voiding trial or home with pascal. Subjective:     No acute  changes / events. Patient tolerating pascal catheter well. Urine has cleared. Pt has been afebrile and feels well postoperatively. Objective:     Admit weight: Weight: 209 lb (94.8 kg)  Last recorded weight: Weight: 209 lb (94.8 kg)    Temp (24hrs), Av.5 °F (36.4 °C), Min:97 °F (36.1 °C), Max:97.9 °F (36.6 °C)          Physical Exam:    GEN: NAD, Nonlabored, A&O x3  ABD: Soft, NT, ND, No CVAT Bilaterally  : Pascal - clear, no swelling with penis/scrotum, bladder is nondistended. CBI on slow rate.     Diagnostics:      Recent Results (from the past 24 hour(s))   METABOLIC PANEL, BASIC    Collection Time: 17  6:06 AM   Result Value Ref Range    Sodium 140 136 - 145 mmol/L    Potassium 4.9 3.5 - 5.5 mmol/L    Chloride 106 100 - 108 mmol/L    CO2 29 21 - 32 mmol/L    Anion gap 5 3.0 - 18 mmol/L    Glucose 91 74 - 99 mg/dL    BUN 19 (H) 7.0 - 18 MG/DL    Creatinine 1.29 0.6 - 1.3 MG/DL    BUN/Creatinine ratio 15 12 - 20      GFR est AA >60 >60 ml/min/1.73m2    GFR est non-AA 54 (L) >60 ml/min/1.73m2    Calcium 8.2 (L) 8.5 - 10.1 MG/DL     Peyton Bond MD

## 2017-05-16 NOTE — OP NOTES
[facility]    Name:  Jhony Price  MR#:  [id number]  :  1940  Account #:  [de-identified]  Date of Adm:  05/15/2017  Date of Surgery:  05/15/2017      PREOPERATIVE DIAGNOSIS: Bladder neck contracture, status post  PVP in 2016. POSTOPERATIVE DIAGNOSIS: Bladder neck contracture, status post  PVP in 2016. PROCEDURES PERFORMED: Cystoscopy with transurethral incision  of prostate at 4 and 8 o'clock of the bladder neck, transurethral incision  of bladder neck contracture. SURGEON: Tiny Suarez MD    ANESTHESIA: General.    ESTIMATED BLOOD LOSS: Less than 25 mL. DRAINS: A 22-Peruvian 3-way Paul catheter to continuous bladder  irrigation. SPECIMENS REMOVED: none     COMPLICATIONS: None. INTRAOPERATIVE FINDINGS: A tight 15-Peruvian bladder neck  contracture noted. This was opened very wide open with transurethral  incision of the bladder neck at 4 and 8 o'clock on the bladder neck. The  ureters were well away from the transurethral incision and bladder  neck contracture incisions. The patient tolerated the procedure well. INDICATIONS: This is a 77-year-old male with a long history of chronic  prostatitis, long history of bladder outlet obstructive symptoms. The  patient also has a neurogenic bladder. His voiding symptoms became  unmanageable. Urodynamics confirmed obstruction in 2016, and we  proceeded with photoselective vaporization of the prostate with the  GreenLight laser. Unfortunately, the patient in a year developed  bladder neck contracture, became symptomatic again with frequency,  urgency, poor urine stream and ejaculatory discomfort. These were the  main symptoms of his previous bladder outlet obstruction. Cystoscopy  confirmed a 15-Peruvian fish-mouth type bladder neck contracture and I  was unable to enter the bladder because of this contracture.  At this  point, I felt that transurethral incision of the bladder neck contracture  was imperative. The patient who understood and desired to proceed. PROCEDURE IN DETAIL: The patient was identified in the holding  area, given informed consent, was then taken to the cystoscopy suite. He was placed on the cystoscopy table in supine position. After  adequate general anesthesia, the patient was placed in dorsal  lithotomy position, appropriately padded, prepped and draped in the  usual sterile fashion for cystoscopy. A timeout was taken, all were in  agreement on the procedure, burn precautions, beta blocker concerns  were addressed. SCDs were placed for DVT prophylaxis, and  appropriate parenteral prophylactic antibiotics were given within 1 hour  of start time. The patient then underwent cystoscopy with the 25-Congolese  resectoscope sheath and the urethra was wide open and normal. The  external sphincter was intact. There was a normal verumontanum and  then the prostate fossa was open, except at the bladder neck, there  was a tight fish-mouth bladder neck contraction. At this point, I replaced the direct visual obturator with the transurethral  incision resectoscope using the Rodas hot knife. Initially at 4 o'clock, I  made the incision on the left bladder neck. This was carried through  the dense tissue of the bladder neck, through the entire prostate  towards the verumontanum and did not include the verumontanum. Once this was performed, I carried this down to the prostatic capsule. Then, I did the identical same incision at 8 o'clock on the right side with  extremely good opening. There was no need for any further resection. I  then deflated the bladder. There was a venous sinus on the right side  of the prostate that had been entered. I did cauterize around this, but I  could not completely stem the flow. With light catheter fixation later, the  bleeding stopped. I removed the resectoscope sheath after  panendoscopy failed to show any other abnormalities.  I removed the  scope and then placed a 22-Khmer 3-way Paul catheter. This was  placed on slow rate continuous bladder irrigation, and the urine was  light pink to clear. The procedure was terminated. The patient will come into the hospital overnight at least for continuous  bladder irrigation because of the venous sinus. The patient understood  this preoperatively and understood the potential of some bleeding also. In the recovery room, he understood this as well and desired to  proceed accordingly. The patient otherwise tolerated the procedure  well and was taken to the recovery room in stable condition, draining  light pink to clear urine.         MD Lety Ng / Олег Suh  D:  05/15/2017   23:36  T:  05/16/2017   09:54  Job #:  [id number]

## 2017-05-16 NOTE — PROGRESS NOTES
Patient and/or next of kin has been given and has signed the [facility] Outpatient Observation  Notification letter and all questions answered. Copy of this notice given to patient and copy placed on chart. Patient and/or next of kin has been given the Outpatient Observation Information and Notification letter and all questions answered.

## 2017-05-16 NOTE — PROGRESS NOTES
9777 received patient in bed, no acute distress noted, urine output yellow straw free of clots, patient reporting 0/10 pain, CBI bag at level 1000 call bell within reach      0845 CBI dc, patient ambulating in hallway, no acute distress noted, 750  CBI bag

## 2017-05-16 NOTE — ACP (ADVANCE CARE PLANNING)
Patient has designated ________wife________________ to participate in his/her discharge plan and to receive any needed information.      Name: Jace Monikaflorida  Address:  Phone [phone]

## 2017-05-16 NOTE — PROGRESS NOTES
4908 discharge instruction given, patient verbalized understanding, teach back utilized, patient received scripts from [employer].

## 2017-05-16 NOTE — DISCHARGE INSTRUCTIONS
DISCHARGE SUMMARY from Nurse    The following personal items are in your possession at time of discharge:    Dental Appliances: None  Visual Aid: With patient, Glasses  Hearing Aids/Status: Left, Right, With patient  Home Medications: None  Jewelry: Watch  Clothing: Undergarments, Pants, Shirt, Footwear  Other Valuables: Other (comment) (hearing aids)             PATIENT INSTRUCTIONS:    After general anesthesia or intravenous sedation, for 24 hours or while taking prescription Narcotics:  · Limit your activities  · Do not drive and operate hazardous machinery  · Do not make important personal or business decisions  · Do  not drink alcoholic beverages  · If you have not urinated within 8 hours after discharge, please contact your surgeon on call. Report the following to your surgeon:  · Excessive pain, swelling, redness or odor of or around the surgical area  · Temperature over 100.5  · Nausea and vomiting lasting longer than 4 hours or if unable to take medications  · Any signs of decreased circulation or nerve impairment to extremity: change in color, persistent  numbness, tingling, coldness or increase pain  · Any questions        What to do at Home:  Recommended activity: Activity as tolerated and No driving while on analgesics. If you experience any of the following symptoms fever greater than 100.1, chills, pain unrelieved by pain medication, decreased urinary output, painful urination, decreased urinary output, bloody urine, please follow up with MD Betty Reeder. *  Please give a list of your current medications to your Primary Care Provider. *  Please update this list whenever your medications are discontinued, doses are      changed, or new medications (including over-the-counter products) are added. *  Please carry medication information at all times in case of emergency situations.           These are general instructions for a healthy lifestyle:    No smoking/ No tobacco products/ Avoid exposure to second hand smoke    Surgeon General's Warning:  Quitting smoking now greatly reduces serious risk to your health. Obesity, smoking, and sedentary lifestyle greatly increases your risk for illness    A healthy diet, regular physical exercise & weight monitoring are important for maintaining a healthy lifestyle    You may be retaining fluid if you have a history of heart failure or if you experience any of the following symptoms:  Weight gain of 3 pounds or more overnight or 5 pounds in a week, increased swelling in our hands or feet or shortness of breath while lying flat in bed. Please call your doctor as soon as you notice any of these symptoms; do not wait until your next office visit. Recognize signs and symptoms of STROKE:    F-face looks uneven    A-arms unable to move or move unevenly    S-speech slurred or non-existent    T-time-call 911 as soon as signs and symptoms begin-DO NOT go       Back to bed or wait to see if you get better-TIME IS BRAIN. Warning Signs of HEART ATTACK     Call 911 if you have these symptoms:   Chest discomfort. Most heart attacks involve discomfort in the center of the chest that lasts more than a few minutes, or that goes away and comes back. It can feel like uncomfortable pressure, squeezing, fullness, or pain.  Discomfort in other areas of the upper body. Symptoms can include pain or discomfort in one or both arms, the back, neck, jaw, or stomach.  Shortness of breath with or without chest discomfort.  Other signs may include breaking out in a cold sweat, nausea, or lightheadedness. Don't wait more than five minutes to call 911 - MINUTES MATTER! Fast action can save your life. Calling 911 is almost always the fastest way to get lifesaving treatment. Emergency Medical Services staff can begin treatment when they arrive -- up to an hour sooner than if someone gets to the hospital by car.        The discharge information has been reviewed with the patient. The patient verbalized understanding. Discharge medications reviewed with the patient and appropriate educational materials and side effects teaching were provided. Patient armband removed and shredded    Discharge medications reviewed with the patient and appropriate educational materials and side effects teaching were provided.

## 2017-05-16 NOTE — PROGRESS NOTES
Certified Lorie Keene provider rounded on [address] [place] [address] to provide education related to sleep apnea after chart review for risk factors. Risk factors include:  1. Hypertension  2. GERD  3. Coronary Artery Disease  4. Cancer  5. Left Bundle Branch Block  6. Mallampati II  7. STOP BANG score 4  8. [place] Sleepiness score 3    Provided patient with the following pamphlets:  1. What is Sleep Apnea  2. Sleep and Medical problems  3. Sleep & Your Heart  4. Common Sleep Problems for Older Adults  5. Tips For Sleep As You Age  10. Tips For Better Sleep In Older Adults    Patient Education:  1. Reviewed sleep hygiene & effects of poor sleep quality. 2. Reviewed relationship between sleep & heart health. 3. Reviewed relationship between sleep & age. Recommendations:  1. Referral to sleep specialist for evaluation if symptoms of poor sleep quality present. 2. Order baseline PSG testing to be arranged as an outpatient. 3. Follow up with sleep specialist for treatment and management.

## 2017-05-22 NOTE — ANCILLARY DISCHARGE INSTRUCTIONS
[facility] [address]  Discharge Phone Call       After-Care Discharge Phone Call Questions: no answer     Were you able to get your prescriptions filled? Comment:      [] Yes  []No    Comment if answer is \"No\"   Are you taking your medication(s) as your doctor ordered? Do you understand the purpose of your medications? Comment:    [] Yes  []No    Comment if answer is \"No\"   Are you taking any other medications that are not on the list?  Comment:      [] Yes  []No    Comment if answer is \"Yes\"   Do you have any questions about your medications? Are you aware of potential side effects? Comment:    [] Yes  []No    Comment if answer is \"Yes\"   Did you make your follow-up appointments (if the hospital did not do this before  discharge)? Comment:    [] Yes  []No    Comment if answer is \"No\"   Is there any reason you might not be able to keep your follow-up appointments? Comment:     [] Yes  []No    Comment if answer is \"Yes\"   Do you have any questions about your care plan? Are you aware of what health problems to be alert for? Comment:    [] Yes  []No    Comment if answer is \"Yes\"   Do you have a good understanding of how you should manage your health? Comment:    [] Yes  []No    Comment if answer is \"Yes\"   Do you know which symptoms to watch for that would mean you would need to call your doctor right away? Comment:      [] Yes  []No    Comment if answer is \"No\"   Do you have any questions about the follow up process or any instructions that we have provided? Comment:    [] Yes  []No    Comment if answer is \"Yes\"   Did staff take your preferences into account?         [] Yes  []No    Comment if answer is \"Yes\"

## 2017-11-30 ENCOUNTER — IMPORTED ENCOUNTER (OUTPATIENT)
Dept: URBAN - METROPOLITAN AREA CLINIC 1 | Facility: CLINIC | Age: 77
End: 2017-11-30

## 2017-11-30 PROBLEM — H04.123: Noted: 2017-11-30

## 2017-11-30 PROBLEM — Z96.1: Noted: 2017-11-30

## 2017-11-30 PROBLEM — H26.493: Noted: 2017-11-30

## 2017-11-30 PROBLEM — H16.143: Noted: 2017-11-30

## 2017-11-30 PROCEDURE — 92014 COMPRE OPH EXAM EST PT 1/>: CPT

## 2017-11-30 NOTE — PATIENT DISCUSSION
1.  DONTA w/ PEK OU- Continue ATs TID OU routinely. 2.  PCO OU: (Posterior Capsule Opacification)   Observe  3. Pseudophakia OU - (Standard OU) Letter to PCP Return for an appointment in 1 yr 27 with Dr. Sonia Banks.

## 2018-08-07 PROBLEM — H02.834: Noted: 2020-08-11

## 2018-08-07 PROBLEM — Z96.1: Noted: 2018-08-07

## 2018-08-07 PROBLEM — H02.831: Noted: 2020-08-11

## 2019-08-08 ENCOUNTER — IMPORTED ENCOUNTER (OUTPATIENT)
Dept: URBAN - NONMETROPOLITAN AREA CLINIC 1 | Facility: CLINIC | Age: 79
End: 2019-08-08

## 2019-08-08 PROCEDURE — 92014 COMPRE OPH EXAM EST PT 1/>: CPT

## 2019-08-08 PROCEDURE — 92015 DETERMINE REFRACTIVE STATE: CPT

## 2020-08-11 ENCOUNTER — IMPORTED ENCOUNTER (OUTPATIENT)
Dept: URBAN - NONMETROPOLITAN AREA CLINIC 1 | Facility: CLINIC | Age: 80
End: 2020-08-11

## 2020-08-11 PROCEDURE — 92014 COMPRE OPH EXAM EST PT 1/>: CPT

## 2021-08-03 PROBLEM — N52.9 ERECTILE DYSFUNCTION: Status: RESOLVED | Noted: 2021-08-03 | Resolved: 2021-08-03

## 2021-08-03 PROBLEM — I10 ESSENTIAL HYPERTENSION: Status: RESOLVED | Noted: 2021-08-03 | Resolved: 2021-08-03

## 2022-04-02 ASSESSMENT — TONOMETRY
OS_IOP_MMHG: 9
OD_IOP_MMHG: 9

## 2022-04-02 ASSESSMENT — VISUAL ACUITY
OD_CC: 20/20
OS_CC: 20/30

## 2022-04-09 ASSESSMENT — TONOMETRY
OS_IOP_MMHG: 13
OD_IOP_MMHG: 15
OS_IOP_MMHG: 15
OD_IOP_MMHG: 13

## 2022-04-09 ASSESSMENT — VISUAL ACUITY
OS_CC: 20/30
OU_CC: J1
OD_CC: 20/25-2
OD_CC: 20/25
OS_CC: 20/25-2

## 2022-11-07 ENCOUNTER — COMPREHENSIVE EXAM (OUTPATIENT)
Dept: RURAL CLINIC 1 | Facility: CLINIC | Age: 82
End: 2022-11-07

## 2022-11-07 DIAGNOSIS — H02.834: ICD-10-CM

## 2022-11-07 DIAGNOSIS — H02.831: ICD-10-CM

## 2022-11-07 DIAGNOSIS — Z96.1: ICD-10-CM

## 2022-11-07 PROCEDURE — 99214 OFFICE O/P EST MOD 30 MIN: CPT

## 2022-11-07 ASSESSMENT — VISUAL ACUITY
OU_SC: 20/30-1
OD_SC: 20/25-1
OU_SC: 20/50
OD_SC: 20/50
OS_SC: 20/50
OS_SC: 20/40

## 2022-11-07 ASSESSMENT — TONOMETRY
OS_IOP_MMHG: 14
OD_IOP_MMHG: 14

## 2024-02-16 NOTE — H&P
Simuel Gosselin  1940     Assessment:      ICD-10-CM ICD-9-CM     1. BPH (benign prostatic hypertrophy) with urinary obstruction N40.1 600.01       N13.8 599.69     2. Other male erectile dysfunction N52.8 607.84        1. BPH s/p PVP 10/6/16, voiding well, but acutely has now had decreased Force of stream and ejaculatory problems. Currently on Cialis 5 mg daily. Cystoscopy 3/21/17 revealed 14Fr fish-mouth bladder neck contracture - cannot get into bladder. 2. ED, decreased erections. Currently using Trimix ICI. 3. Normal screening PSA, most recently 0.6 ng/ml in 1/2016.     Plan:  1. Cystoscopy in office - see separate office note  2. Continue Cialis 5 mg daily  3. Continue Trimix ICI  4. Schedule TUIP          Chief Complaint   Patient presents with    Procedure       cysto      History of Present Illness: Simuel Gosselin is a 68 y.o. male who presented for cystoscopy on 3/21/2017. He has BPH with LUTS and ED. He is s/p PVP on 10/6/16. The patient called the office shortly after his last appointment with complaints of increased frequency, decreased flow, and a change in sexual experience. He was started on Cipro 500 mg without improvement. No gross hematuria or dysuria. Reports nocturia x2. He is currently on Cialis 5 mg daily. He has ED and is using Trimix ICI.      The patient has a past medical history of colon cancer s/p partial colectomy and chemotherapy in 2014.  He recently had a cardiac catheterization.     PSA /TESTOSTERONE - BSI PSA Testosterone, total   Latest Ref Rng 0.0 - 4.0 ng/mL 348 - 1197   1/27/2016 0.60 802   1/27/2015 0.7 465   3/28/2014   754   5/2/2013   430   12/19/2012   1491 (A)           Past Medical History:   Diagnosis Date    Allergic rhinitis      Anejaculation      Anorgasmia of male      Arthritis      Benign enlargement of prostate      CAD (coronary artery disease)      Chronic prostatitis      Colon cancer ([facility] [address] 2012     S/P COLECTOMY W/CHEMO    Erectile dysfunction      GERD (gastroesophageal reflux disease)      Hypertension      Hypertrophy of prostate without urinary obstruction      Left bundle branch block (LBBB) on electrocardiography 09/07/2016    Neurogenic bladder      Nocturia      Prostatitis, chronic      Unstable angina (HCC)      Urinary hesitancy      Urine stream spraying              Past Surgical History:   Procedure Laterality Date    HX CATARACT REMOVAL Bilateral 12/2015    HX COLECTOMY   2012    HX HEART CATHETERIZATION   12/20/2016    HX HEMORRHOIDECTOMY        HX HERNIA REPAIR Right       inguinal    HX KNEE REPLACEMENT   [id number]     BILAT KNEES    HX KNEE REPLACEMENT        HX RHINOPLASTY        HX SHOULDER ARTHROSCOPY        HX SHOULDER ARTHROSCOPY   2010    HX VASECTOMY                 Family History   Problem Relation Age of Onset    Lung Disease Mother         HEPATITIS    Arthritis-osteo Mother      Kidney Disease Mother         UNKNOWN    Cancer Father         LEUKEMIA AND MESOTHELIOMA       Social History                Social History    Marital status:        Spouse name: N/A    Number of children: N/A    Years of education: N/A             Social History Main Topics    Smoking status: Never Smoker    Smokeless tobacco: Never Used    Alcohol use Yes     Drug use:  Yes    Sexual activity: Yes       Partners: Female           Other Topics Concern    None      Social History Narrative         No Known Allergies     Current Outpatient Prescriptions   Medication Sig Dispense Refill    diclofenac (VOLTAREN) 1 % gel Apply to affected area four (4) times daily.        rosuvastatin (CRESTOR) 20 mg tablet Take 20 mg by mouth nightly.        oxyCODONE-acetaminophen (PERCOCET) 5-325 mg per tablet Take 1 Tab by mouth every four (4) hours as needed for Pain.        clopidogrel (PLAVIX) 75 mg tab Take by mouth.        lisinopril (PRINIVIL, ZESTRIL) 2.5 mg tablet Take by mouth daily.        sildenafil citrate (VIAGRA) 100 mg tablet Take 1 Tab by mouth daily as needed. 40 Tab 3    tadalafil (CIALIS) 5 mg tablet Take 1 Tab by mouth daily as needed. 90 Tab 3    solifenacin (VESICARE) 5 mg tablet Take 1 Tab by mouth daily. 90 Tab 3    carvedilol (COREG) 3.125 mg tablet Take 3.125 mg by mouth two (2) times daily (with meals).        aspirin delayed-release 81 mg tablet Take by mouth daily.        testosterone enanthate (DELATESTRYL) 200 mg/mL injection 1 mL by IntraMUSCular route every fourteen (14) days. 5 mL 4    cetirizine (ZYRTEC) 10 mg tablet Take by mouth.        vitamin E (AQUA GEMS) 400 unit capsule Take by mouth daily.        FERROUS SULFATE PO Take by mouth.        multivitamin (ONE A DAY) tablet Take 1 Tab by mouth daily.        zolpidem CR (AMBIEN CR) 12.5 mg tablet Take 12.5 mg by mouth nightly as needed for Sleep.        cyanocobalamin 1,000 mcg tablet Take 1,000 mcg by mouth daily.        omeprazole (PRILOSEC) 40 mg capsule          Biotin 2,500 mcg cap Take by mouth.        ampicillin (PRINCIPEN) 500 mg capsule Take 1 Cap by mouth Before breakfast, lunch, dinner and at bedtime. 40 Cap 0    OTHER,NON-FORMULARY, Please dispense a 6 ml volume of Trimix consisting of PGE 50 mcg/Papaverine 30 mg/Phentolamine 2 mg per ml. Patient is to inject 1.0 ml as directed. 6 Each 5      Review of Systems  Constitutional: Fever: No  Skin: Rash: No  HEENT: Hearing difficulty: No  Eyes: Blurred vision: No  Cardiovascular: Chest pain: No  Respiratory: Shortness of breath: No  Gastrointestinal: Nausea/vomiting: No  Musculoskeletal: Back pain: No  Neurological: Weakness: No  Psychological: Memory loss: No  Comments/additional findings:      Physical Exam:       Visit Vitals    /70    Ht 6' 2\" (1.88 m)    Wt 200 lb (90.7 kg)    BMI 25.68 kg/m2      Constitutional: WDWN, Pleasant and appropriate affect, No acute distress.    CV: No peripheral swelling noted, RRR  Respiratory: No respiratory distress or difficulties, CTAB. Abdomen: No abdominal masses or tenderness. No CVA tenderness. No hernias noted. Skin: No jaundice. Neuro/Psych: Alert and oriented x 3. Affect appropriate. Lymphatic: No enlarged inguinal lymph nodes.       REVIEW OF LABS AND IMAGING:         Results for orders placed or performed in visit on 01/27/17   AMB POC PVR, TUSHAR,POST-VOID RES,US,NON-IMAGING   Result Value Ref Range     PVR 0 cc      Pre-Op Urodynamic Evaluation 11/06/12:   Comments: UDS with patient on Vesicare. Per diary he has large I&O /24 hrs volumes as well as ecsessive caffeine intake. Small capacity bladder with increased sensations. He had a few DO near capacity without leakage. He voided with high pressure detrusor contraction,low flow, partially relaxed pelvic floor and incomplete emptying.  His pressure-flow parameters in the markedly obstructive range.          Shawna Craft MD [Follow-Up Visit] : a follow-up visit for [Onychomycosis] : onychomycosis

## (undated) DEVICE — CATHETER URETH PED 22FR BLLN 3CC 2 W F INF CTRL BARDX

## (undated) DEVICE — SOLUTION IV 1000ML 0.9% SOD CHL

## (undated) DEVICE — SOLUTION IRRIG 3000ML 0.9% SOD CHL FLX CONT 0797208] ICU MEDICAL INC]

## (undated) DEVICE — Device

## (undated) DEVICE — SLEEVE COMPR L THGH LEN WARP

## (undated) DEVICE — SOLUTION SET, MALE LUER LOCK ADAPTER

## (undated) DEVICE — Y-TYPE TUR/BLADDER IRRIGATION SET, REGULATING CLAMP

## (undated) DEVICE — DEVICE SECUREMENT AD W/ TRICOT ANCHR PD FOR F LTX SIL CATH

## (undated) DEVICE — CATHETER URETH 22FR 30CC BLLN F 3 W SPEC M RND TIP TWO

## (undated) DEVICE — SYR IRR CATH TIP LR ADPT 70ML -- CONVERT TO ITEM 363120

## (undated) DEVICE — METER URIN 350ML INF CTRL MICROBICIDAL OUTLT TB EZ LOK SAMP

## (undated) DEVICE — SPONGE GZ W4XL4IN COT 12 PLY TYP VII WVN C FLD DSGN

## (undated) DEVICE — SOLUTION SCRB 4OZ 10% PVP I POVIDONE IOD TOP PAINT EXIDINE

## (undated) DEVICE — FOUR LEAD ARTHROSCOPIC IRRIGATION SET

## (undated) DEVICE — BAG DRAIN URIN 2000ML LF STRL -- CONVERT TO ITEM 363123

## (undated) DEVICE — CONTAINER DRN 20L DISP FLUIDRN LLS

## (undated) DEVICE — KENDALL SCD EXPRESS SLEEVES, KNEE LENGTH, MEDIUM: Brand: KENDALL SCD

## (undated) DEVICE — STERILE LATEX POWDER-FREE SURGICAL GLOVESWITH NITRILE COATING: Brand: PROTEXIS

## (undated) DEVICE — CORD DISPOSABLE ACTIVE 10FT -- GYRUS

## (undated) DEVICE — TUBING IRRIG L96IN DIA0.241IN L BOR T-U-R W/ NVENT PIERCING

## (undated) DEVICE — BAG DRNGE 2000ML INF CTRL W ANTIREFLX CHMBR MICROBICIDAL

## (undated) DEVICE — SOLUTION IRRIG 1000ML H2O STRL BLT

## (undated) DEVICE — BAG DRNGE NONSTERILE W/ SUCT HOSE CYSTO/UROLOGICAL FOR GE